# Patient Record
Sex: FEMALE | Race: WHITE | Employment: FULL TIME | URBAN - METROPOLITAN AREA
[De-identification: names, ages, dates, MRNs, and addresses within clinical notes are randomized per-mention and may not be internally consistent; named-entity substitution may affect disease eponyms.]

---

## 2020-03-10 ENCOUNTER — OFFICE VISIT (OUTPATIENT)
Dept: ENDOCRINOLOGY | Age: 45
End: 2020-03-10
Payer: COMMERCIAL

## 2020-03-10 VITALS
DIASTOLIC BLOOD PRESSURE: 77 MMHG | BODY MASS INDEX: 20.88 KG/M2 | OXYGEN SATURATION: 98 % | WEIGHT: 133 LBS | HEIGHT: 67 IN | HEART RATE: 81 BPM | SYSTOLIC BLOOD PRESSURE: 118 MMHG

## 2020-03-10 DIAGNOSIS — E06.3 HYPOTHYROIDISM DUE TO HASHIMOTO'S THYROIDITIS: ICD-10-CM

## 2020-03-10 DIAGNOSIS — E03.8 HYPOTHYROIDISM DUE TO HASHIMOTO'S THYROIDITIS: ICD-10-CM

## 2020-03-10 LAB
ANION GAP SERPL CALCULATED.3IONS-SCNC: 13 MMOL/L (ref 3–16)
BUN BLDV-MCNC: 9 MG/DL (ref 7–20)
CALCIUM SERPL-MCNC: 9.5 MG/DL (ref 8.3–10.6)
CHLORIDE BLD-SCNC: 102 MMOL/L (ref 99–110)
CO2: 25 MMOL/L (ref 21–32)
CREAT SERPL-MCNC: 0.6 MG/DL (ref 0.6–1.1)
GFR AFRICAN AMERICAN: >60
GFR NON-AFRICAN AMERICAN: >60
GLUCOSE BLD-MCNC: 93 MG/DL (ref 70–99)
POTASSIUM SERPL-SCNC: 4.6 MMOL/L (ref 3.5–5.1)
SODIUM BLD-SCNC: 140 MMOL/L (ref 136–145)
T3 FREE: 5.7 PG/ML (ref 2.3–4.2)
T4 FREE: 1 NG/DL (ref 0.9–1.8)
TSH SERPL DL<=0.05 MIU/L-ACNC: 0.01 UIU/ML (ref 0.27–4.2)

## 2020-03-10 PROCEDURE — 99243 OFF/OP CNSLTJ NEW/EST LOW 30: CPT | Performed by: INTERNAL MEDICINE

## 2020-03-10 RX ORDER — LEVOTHYROXINE AND LIOTHYRONINE 57; 13.5 UG/1; UG/1
TABLET ORAL
COMMUNITY
End: 2020-11-12 | Stop reason: SDUPTHER

## 2020-03-10 RX ORDER — LEVOTHYROXINE AND LIOTHYRONINE 76; 18 UG/1; UG/1
TABLET ORAL
COMMUNITY
End: 2020-11-12 | Stop reason: SDUPTHER

## 2020-03-10 RX ORDER — ERGOCALCIFEROL 1.25 MG/1
50000 CAPSULE ORAL WEEKLY
COMMUNITY
End: 2020-06-12 | Stop reason: SDUPTHER

## 2020-03-10 NOTE — PROGRESS NOTES
Endocrinology       New Patient Consultation  Bushra Moser M.D. Phone: [phone]   FAX: [phone]       Adriana Peralta   YOB: 1975    Date of Visit:  3/10/2020    Allergies   Allergen Reactions    Codeine Other (See Comments)     Makes pt mean.  Morphine Other (See Comments)     Makes pt mean      Outpatient Medications Marked as Taking for the 3/10/20 encounter (Office Visit) with May Mckeon MD   Medication Sig Dispense Refill    vitamin D (ERGOCALCIFEROL) 1.25 MG (95136 UT) CAPS capsule Take 50,000 Units by mouth once a week           Vitals:    03/10/20 1035   BP: 118/77   Site: Left Upper Arm   Position: Sitting   Cuff Size: Medium Adult   Pulse: 81   SpO2: 98%   Weight: 133 lb (60.3 kg)   Height: 5' 7\" (1.702 m)     Body mass index is 20.83 kg/m². Wt Readings from Last 3 Encounters:   03/10/20 133 lb (60.3 kg)     BP Readings from Last 3 Encounters:   03/10/20 118/77        Past Medical History:   Diagnosis Date    Hashimoto disease      Past Surgical History:   Procedure Laterality Date    BREAST LUMPECTOMY      HYSTERECTOMY      LEEP      MASTECTOMY      TONSILLECTOMY      UTERINE SUSPENSION       History reviewed. No pertinent family history. Social History     Tobacco Use   Smoking Status Current Every Day Smoker    Packs/day: 1.50    Types: Cigarettes   Smokeless Tobacco Never Used      Social History     Substance and Sexual Activity   Alcohol Use No       HPI      Adriana Peralta is a 53 y.o. female who is here for initial evaluation of thyroid disease        Seen at the request of  Vishnu Lepe DO     Was seeing Dr. Wilder Mera    Patient has a PMH of hypothyroidism, vit D def, breast ca ( s/p surgery, radiation) , Migraine headache. Diagnosed with Hypothyroidism at the age of 30 yrs. Tried levothyroxine /synthroid. Was on synthroid 125 mcg daily.      Had poor response to levothyrxoine    Current thyroid medication: NP

## 2020-03-11 ENCOUNTER — TELEPHONE (OUTPATIENT)
Dept: ENDOCRINOLOGY | Age: 45
End: 2020-03-11

## 2020-03-11 NOTE — TELEPHONE ENCOUNTER
Patient returned call for lab results. Patient was read what christine said. She said she will just flip flop the medications. She wants to know if she needs repeat blood work before her appt .

## 2020-03-12 NOTE — TELEPHONE ENCOUNTER
Called pt and l/m advised per Dr. Hawk Berks: Yes, repeat labs before next visit . Orders in her chart.   3/12/2020 @ 1051am.

## 2020-06-12 ENCOUNTER — VIRTUAL VISIT (OUTPATIENT)
Dept: ENDOCRINOLOGY | Age: 45
End: 2020-06-12
Payer: COMMERCIAL

## 2020-06-12 PROCEDURE — 99214 OFFICE O/P EST MOD 30 MIN: CPT | Performed by: INTERNAL MEDICINE

## 2020-06-12 RX ORDER — ERGOCALCIFEROL 1.25 MG/1
50000 CAPSULE ORAL WEEKLY
Qty: 4 CAPSULE | Refills: 3 | Status: SHIPPED | OUTPATIENT
Start: 2020-06-12 | End: 2020-11-12 | Stop reason: SDUPTHER

## 2020-06-12 ASSESSMENT — ENCOUNTER SYMPTOMS
VOMITING: 0
SHORTNESS OF BREATH: 0
BACK PAIN: 0
NAUSEA: 0
ABDOMINAL PAIN: 0
COUGH: 0
SORE THROAT: 0

## 2020-06-12 NOTE — PROGRESS NOTES
synthroid 125 mcg daily. Had poor response to levothyrxoine    Current thyroid medication:NP Thyroid 90 mg 4 days a week and take 120 mg on other 3 days since 03/20     Takes it first thing in the morning on empty stomach. FH of hypothyroidism: Mother-hypothyroidism/Goiter    FH of thyroid cancer: no      Weight is stable. C/o fatigue    C/o cold intolerance, hot flashes, poor sleep. 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 Review of Systems   HENT: Negative for sore throat. Respiratory: Negative for cough and shortness of breath. Cardiovascular: Negative for chest pain and palpitations. Gastrointestinal: Negative for abdominal pain, nausea and vomiting. Endocrine: Negative for polydipsia. Genitourinary: Negative for frequency and urgency. Musculoskeletal: Negative for back pain and myalgias. Skin: Negative for rash. Neurological: Negative for headaches. Psychiatric/Behavioral: The patient is not nervous/anxious. Brief exam on video visit  Patient alert,  Awake, oriented. Normal speech  No distress noted  Normal eyes   Normal hearing  Normal  hand movements. ,Thyroid US 2014    HISTORY:  follow up.     .Chronic lymphocytic thyroiditis    TECHNIQUE: Static grayscale images and color Doppler of the thyroid were performed. COMPARISON: None. FINDINGS:    Right thyroid lobe: Measures 4.7 x 1.2 x 1.5 cm. Right thyroid lobe is heterogeneous in echogenicity but without discrete mass. Left thyroid lobe: Measures 4.4 x 1.2 x 1.2 cm. Left thyroid lobe is heterogeneous in echogenicity but without discrete mass  Isthmus: Unremarkable. No adjacent masses or enlarged lymph nodes. Result Impression       Heterogeneous thyroid echogenicity consistent with history of thyroiditis         Assessment/Plan      1.  Hypothyroidism     This 50 yrs old female has hypothyroidism    Etiology is hashimoto's thyroiditis. US in 2014 showed :   Heterogeneous thyroid echogenicity consistent with history of thyroiditis. She is currently on NP Thyroid 90 mg 4 days a week and take 120 mg on other 3 days    C/o fatigue, hair loss, dry skin      Will repeat labs      2. Vit D def. On replacement      Will check her levels,       3. Fatigue    Non -specific symptoms    Will check CBC.

## 2020-06-15 ENCOUNTER — TELEPHONE (OUTPATIENT)
Dept: ENDOCRINOLOGY | Age: 45
End: 2020-06-15

## 2020-06-15 ENCOUNTER — HOSPITAL ENCOUNTER (OUTPATIENT)
Age: 45
Discharge: HOME | End: 2020-06-15
Payer: COMMERCIAL

## 2020-06-15 LAB
A/G RATIO: 1.3 (ref 1.1–2.2)
ALBUMIN SERPL-MCNC: 4.3 G/DL (ref 3.4–5)
ALP BLD-CCNC: 158 U/L (ref 40–129)
ALT SERPL-CCNC: 13 U/L (ref 10–40)
ANION GAP SERPL CALCULATED.3IONS-SCNC: 10 MMOL/L (ref 3–16)
AST SERPL-CCNC: 18 U/L (ref 15–37)
BILIRUB SERPL-MCNC: 0.4 MG/DL (ref 0–1)
BUN BLDV-MCNC: 11 MG/DL (ref 7–20)
CALCIUM SERPL-MCNC: 9.9 MG/DL (ref 8.3–10.6)
CHLORIDE BLD-SCNC: 106 MMOL/L (ref 99–110)
CO2: 26 MMOL/L (ref 21–32)
CREAT SERPL-MCNC: 0.7 MG/DL (ref 0.6–1.1)
GFR AFRICAN AMERICAN: >60
GFR NON-AFRICAN AMERICAN: >60
GLOBULIN: 3.2 G/DL
GLUCOSE BLD-MCNC: 106 MG/DL (ref 70–99)
HCT VFR BLD CALC: 42.6 % (ref 36–48)
HEMOGLOBIN: 14.5 G/DL (ref 12–16)
MCH RBC QN AUTO: 31.1 PG (ref 26–34)
MCHC RBC AUTO-ENTMCNC: 34 G/DL (ref 31–36)
MCV RBC AUTO: 91.6 FL (ref 80–100)
PDW BLD-RTO: 12.6 % (ref 12.4–15.4)
PLATELET # BLD: 302 K/UL (ref 135–450)
PMV BLD AUTO: 7.9 FL (ref 5–10.5)
POTASSIUM SERPL-SCNC: 4.3 MMOL/L (ref 3.5–5.1)
RBC # BLD: 4.65 M/UL (ref 4–5.2)
SODIUM BLD-SCNC: 142 MMOL/L (ref 136–145)
TOTAL PROTEIN: 7.5 G/DL (ref 6.4–8.2)
WBC # BLD: 6.2 K/UL (ref 4–11)

## 2020-06-15 PROCEDURE — 84481 FREE ASSAY (FT-3): CPT

## 2020-06-15 PROCEDURE — 82306 VITAMIN D 25 HYDROXY: CPT

## 2020-06-15 PROCEDURE — 84439 ASSAY OF FREE THYROXINE: CPT

## 2020-06-15 PROCEDURE — 36415 COLL VENOUS BLD VENIPUNCTURE: CPT

## 2020-06-15 PROCEDURE — 84443 ASSAY THYROID STIM HORMONE: CPT

## 2020-06-15 PROCEDURE — 85027 COMPLETE CBC AUTOMATED: CPT

## 2020-06-15 PROCEDURE — 80053 COMPREHEN METABOLIC PANEL: CPT

## 2020-06-15 NOTE — TELEPHONE ENCOUNTER
----- Message from Melodie Carrel, MD sent at 6/12/2020 10:30 AM EDT -----  Please schedule a follow-up in 4 month.

## 2020-06-16 ENCOUNTER — TELEPHONE (OUTPATIENT)
Dept: ENDOCRINOLOGY | Age: 45
End: 2020-06-16

## 2020-06-16 LAB
T3 FREE: 8 PG/ML (ref 2.3–4.2)
T4 FREE: 1.4 NG/DL (ref 0.9–1.8)
TSH SERPL DL<=0.05 MIU/L-ACNC: <0.01 UIU/ML (ref 0.27–4.2)
VITAMIN D 25-HYDROXY: 57.9 NG/ML

## 2020-11-12 RX ORDER — ERGOCALCIFEROL 1.25 MG/1
50000 CAPSULE ORAL WEEKLY
Qty: 4 CAPSULE | Refills: 3 | Status: SHIPPED | OUTPATIENT
Start: 2020-11-12

## 2020-11-12 RX ORDER — LEVOTHYROXINE AND LIOTHYRONINE 57; 13.5 UG/1; UG/1
TABLET ORAL
Qty: 30 TABLET | Refills: 3 | Status: SHIPPED | OUTPATIENT
Start: 2020-11-12

## 2020-11-12 RX ORDER — LEVOTHYROXINE AND LIOTHYRONINE 76; 18 UG/1; UG/1
TABLET ORAL
Qty: 10 TABLET | Refills: 3 | Status: SHIPPED | OUTPATIENT
Start: 2020-11-12

## 2020-11-12 NOTE — TELEPHONE ENCOUNTER
Medication:   Requested Prescriptions     Pending Prescriptions Disp Refills    vitamin D (ERGOCALCIFEROL) 1.25 MG (56018 UT) CAPS capsule 4 capsule 3     Sig: Take 1 capsule by mouth once a week    thyroid (NP THYROID) 120 MG tablet 30 tablet      Sig: NP Thyroid 120 mg tablet   Take 1 tablet every day by oral route.  thyroid (NP THYROID) 90 MG tablet 30 tablet      Sig: NP Thyroid 90 mg tablet   Take 1 tablet every day by oral route.          Last appt: 06/12/2020  Next appt: Visit date not found    Last Thyroid:   Lab Results   Component Value Date    TSH <0.01 06/15/2020    FT3 8.0 06/15/2020    T4FREE 1.4 06/15/2020

## 2024-01-03 ENCOUNTER — OFFICE VISIT (OUTPATIENT)
Dept: FAMILY MEDICINE CLINIC | Age: 49
End: 2024-01-03
Payer: COMMERCIAL

## 2024-01-03 VITALS
WEIGHT: 140.6 LBS | SYSTOLIC BLOOD PRESSURE: 102 MMHG | DIASTOLIC BLOOD PRESSURE: 72 MMHG | BODY MASS INDEX: 22.02 KG/M2 | HEART RATE: 77 BPM | OXYGEN SATURATION: 97 %

## 2024-01-03 DIAGNOSIS — E78.2 MIXED HYPERLIPIDEMIA: ICD-10-CM

## 2024-01-03 DIAGNOSIS — I25.2 HISTORY OF NON-ST ELEVATION MYOCARDIAL INFARCTION (NSTEMI): ICD-10-CM

## 2024-01-03 DIAGNOSIS — I10 PRIMARY HYPERTENSION: ICD-10-CM

## 2024-01-03 DIAGNOSIS — C79.51 CARCINOMA OF RIGHT BREAST METASTATIC TO BONE (HCC): ICD-10-CM

## 2024-01-03 DIAGNOSIS — I25.10 CORONARY ARTERY DISEASE INVOLVING NATIVE CORONARY ARTERY OF NATIVE HEART WITHOUT ANGINA PECTORIS: ICD-10-CM

## 2024-01-03 DIAGNOSIS — C50.911 CARCINOMA OF RIGHT BREAST METASTATIC TO BONE (HCC): ICD-10-CM

## 2024-01-03 DIAGNOSIS — Z76.89 ENCOUNTER TO ESTABLISH CARE: Primary | ICD-10-CM

## 2024-01-03 DIAGNOSIS — E06.3 HASHIMOTO'S DISEASE: ICD-10-CM

## 2024-01-03 PROCEDURE — 3078F DIAST BP <80 MM HG: CPT

## 2024-01-03 PROCEDURE — 99204 OFFICE O/P NEW MOD 45 MIN: CPT

## 2024-01-03 PROCEDURE — 3074F SYST BP LT 130 MM HG: CPT

## 2024-01-03 RX ORDER — APIXABAN 2.5 MG/1
2.5 TABLET, FILM COATED ORAL 2 TIMES DAILY
COMMUNITY
Start: 2023-12-01

## 2024-01-03 RX ORDER — LEVOTHYROXINE SODIUM 0.12 MG/1
125 TABLET ORAL DAILY
COMMUNITY
Start: 2023-12-21

## 2024-01-03 RX ORDER — ASPIRIN 81 MG/1
81 TABLET ORAL DAILY
COMMUNITY
Start: 2023-11-14

## 2024-01-03 RX ORDER — PALBOCICLIB 75 MG/1
75 TABLET, FILM COATED ORAL DAILY
COMMUNITY
Start: 2024-01-02

## 2024-01-03 RX ORDER — CARVEDILOL 6.25 MG/1
6.25 TABLET ORAL 2 TIMES DAILY
COMMUNITY
Start: 2023-11-14

## 2024-01-03 RX ORDER — LETROZOLE 2.5 MG/1
2.5 TABLET, FILM COATED ORAL DAILY
COMMUNITY
Start: 2023-12-01

## 2024-01-03 RX ORDER — ROSUVASTATIN CALCIUM 10 MG/1
40 TABLET, COATED ORAL DAILY
COMMUNITY
Start: 2023-11-14

## 2024-01-03 ASSESSMENT — PATIENT HEALTH QUESTIONNAIRE - PHQ9
SUM OF ALL RESPONSES TO PHQ QUESTIONS 1-9: 0
1. LITTLE INTEREST OR PLEASURE IN DOING THINGS: 0
SUM OF ALL RESPONSES TO PHQ9 QUESTIONS 1 & 2: 0
SUM OF ALL RESPONSES TO PHQ QUESTIONS 1-9: 0
2. FEELING DOWN, DEPRESSED OR HOPELESS: 0
SUM OF ALL RESPONSES TO PHQ QUESTIONS 1-9: 0
SUM OF ALL RESPONSES TO PHQ QUESTIONS 1-9: 0

## 2024-01-03 ASSESSMENT — ENCOUNTER SYMPTOMS
GASTROINTESTINAL NEGATIVE: 1
RESPIRATORY NEGATIVE: 1

## 2024-01-03 NOTE — PROGRESS NOTES
Chief Complaint   Patient presents with    New Patient     Establish care       HPI:  Adelaide Kelly is a 48 y.o. (: 1975) here today to establish care.    Hashimoto's: Taking Synthroid 125 mcg daily.  Last TSH 5.87 on 2023.  Free T4 was normal. Is seeing a new endocrinologist in  Dr. Geovani Trinidad through [facility].     Breast CA: Metastasized to bone/right hip.  Following oncology Narcisa Reyes. Taking Ibrance and Letrozole     Hyperlipidemia: Taking Crestor 40 mg daily.    Hypertension/history non-STEMI/CAD, hx dvt: Follows cardiology Dr.Senan Bolden.  Is on aspirin, Eliquis, Coreg, atorvastatin.    Patient's medications, allergies, past medical, surgical, social and family histories were reviewed and updated as appropriate.    Last CMP panel 2023.  Normal kidney function and liver function.    Had colonoscopy through [facility] in . Has breast implants from reconstructive surgery from prior breat CA in .     ROS:  Review of Systems   Constitutional: Negative.    HENT: Negative.     Respiratory: Negative.     Cardiovascular: Negative.    Gastrointestinal: Negative.    Musculoskeletal:  Positive for arthralgias.   Neurological: Negative.    Psychiatric/Behavioral: Negative.             No results found for: \"LABA1C\", \"LDLCALC\", \"LDLDIRECT\"    Past Medical History:   Diagnosis Date    Breast cancer metastasized to bone (HCC)     CAD (coronary artery disease)     Hashimoto disease     History of DVT (deep vein thrombosis)     Mixed hyperlipidemia     NSTEMI (non-ST elevated myocardial infarction) (HCC)     Primary hypertension     Thyroid goiter        History reviewed. No pertinent family history.    Social History     Socioeconomic History    Marital status:      Spouse name: Not on file    Number of children: Not on file    Years of education: Not on file    Highest education level: Not on file   Occupational History    Not on file   Tobacco Use    Smoking status:

## 2024-01-04 DIAGNOSIS — E78.2 MIXED HYPERLIPIDEMIA: Primary | ICD-10-CM

## 2024-01-04 LAB
CHOLEST SERPL-MCNC: 210 MG/DL (ref 0–199)
HDLC SERPL-MCNC: 44 MG/DL (ref 40–60)
LDLC SERPL CALC-MCNC: 117 MG/DL
T3FREE SERPL-MCNC: 2.1 PG/ML (ref 2.3–4.2)
TRIGL SERPL-MCNC: 244 MG/DL (ref 0–150)
VLDLC SERPL CALC-MCNC: 49 MG/DL

## 2024-01-04 RX ORDER — FENOFIBRATE 48 MG/1
48 TABLET, COATED ORAL DAILY
Qty: 30 TABLET | Refills: 3 | Status: SHIPPED | OUTPATIENT
Start: 2024-01-04

## 2024-03-25 ENCOUNTER — OFFICE VISIT (OUTPATIENT)
Dept: FAMILY MEDICINE CLINIC | Age: 49
End: 2024-03-25
Payer: COMMERCIAL

## 2024-03-25 VITALS
WEIGHT: 140 LBS | HEART RATE: 85 BPM | DIASTOLIC BLOOD PRESSURE: 78 MMHG | TEMPERATURE: 99.5 F | BODY MASS INDEX: 21.93 KG/M2 | OXYGEN SATURATION: 98 % | SYSTOLIC BLOOD PRESSURE: 120 MMHG

## 2024-03-25 DIAGNOSIS — R52 BODY ACHES: ICD-10-CM

## 2024-03-25 DIAGNOSIS — J06.9 VIRAL URI: Primary | ICD-10-CM

## 2024-03-25 DIAGNOSIS — R50.9 FEVER, UNSPECIFIED FEVER CAUSE: ICD-10-CM

## 2024-03-25 LAB
INFLUENZA A ANTIGEN, POC: NEGATIVE
INFLUENZA B ANTIGEN, POC: NEGATIVE
LOT EXPIRE DATE: NORMAL
LOT KIT NUMBER: NORMAL
SARS-COV-2, POC: NORMAL
VALID INTERNAL CONTROL: PRESENT
VENDOR AND KIT NAME POC: NORMAL

## 2024-03-25 PROCEDURE — 87428 SARSCOV & INF VIR A&B AG IA: CPT

## 2024-03-25 PROCEDURE — 99214 OFFICE O/P EST MOD 30 MIN: CPT

## 2024-03-25 RX ORDER — AZITHROMYCIN 250 MG/1
TABLET, FILM COATED ORAL
Qty: 6 TABLET | Refills: 0 | Status: SHIPPED | OUTPATIENT
Start: 2024-03-25 | End: 2024-04-04

## 2024-03-25 SDOH — ECONOMIC STABILITY: FOOD INSECURITY: WITHIN THE PAST 12 MONTHS, THE FOOD YOU BOUGHT JUST DIDN'T LAST AND YOU DIDN'T HAVE MONEY TO GET MORE.: NEVER TRUE

## 2024-03-25 SDOH — ECONOMIC STABILITY: HOUSING INSECURITY
IN THE LAST 12 MONTHS, WAS THERE A TIME WHEN YOU DID NOT HAVE A STEADY PLACE TO SLEEP OR SLEPT IN A SHELTER (INCLUDING NOW)?: NO

## 2024-03-25 SDOH — ECONOMIC STABILITY: FOOD INSECURITY: WITHIN THE PAST 12 MONTHS, YOU WORRIED THAT YOUR FOOD WOULD RUN OUT BEFORE YOU GOT MONEY TO BUY MORE.: NEVER TRUE

## 2024-03-25 SDOH — ECONOMIC STABILITY: INCOME INSECURITY: HOW HARD IS IT FOR YOU TO PAY FOR THE VERY BASICS LIKE FOOD, HOUSING, MEDICAL CARE, AND HEATING?: NOT HARD AT ALL

## 2024-03-25 ASSESSMENT — ENCOUNTER SYMPTOMS
RHINORRHEA: 1
GASTROINTESTINAL NEGATIVE: 1
SORE THROAT: 1
SINUS PAIN: 1
TROUBLE SWALLOWING: 0
COUGH: 1

## 2024-03-25 NOTE — PROGRESS NOTES
Chief Complaint   Patient presents with    Fever     Body aches, chills, cough, x 2 days        HPI:  Adelaide Kelly is a 48 y.o. (: 1975) here today   for evaluation of x20 hours fever, bodies, chills, cough, fatigue. Slight sore throat but does not have tonsils and adenoids. Temp 99.5 Fahrenheit today in office. High of 102F lastnight. Took marichuy seltzer yesterday but did not help. Cough is dry.     Is on Ibrance which can lower the patient's immune system.      Patient's medications, allergies, past medical, surgical, social and family histories were reviewed and updated asappropriate.    ROS:  Review of Systems   Constitutional:  Positive for chills, fatigue and fever.   HENT:  Positive for rhinorrhea, sinus pain and sore throat. Negative for ear pain and trouble swallowing.    Respiratory:  Positive for cough.    Cardiovascular: Negative.    Gastrointestinal: Negative.    Musculoskeletal:  Positive for myalgias.   Neurological: Negative.    Psychiatric/Behavioral: Negative.             Prior to Visit Medications    Medication Sig Taking? Authorizing Provider   azithromycin (ZITHROMAX) 250 MG tablet 500mg on day 1 followed by 250mg on days 2 - 5 Yes Josiah Gould, APRN - CNP   fenofibrate (TRICOR) 48 MG tablet Take 1 tablet by mouth daily  Josiah Gould APRN - CNP   letrozole (FEMARA) 2.5 MG tablet Take 1 tablet by mouth daily  Shelly Davila MD   IBRANCE 75 MG tablet Take 75 mg by mouth daily  Shelly Davila MD   carvedilol (COREG) 6.25 MG tablet Take 1 tablet by mouth 2 times daily  Shelly Davila MD   vitamin D (CHOLECALCIFEROL) 25 MCG (1000 UT) TABS tablet Take 1 tablet by mouth daily  Shelly Davlia MD   ASPIRIN ADULT LOW STRENGTH 81 MG EC tablet Take 1 tablet by mouth daily  Shelly Davila MD   rosuvastatin (CRESTOR) 10 MG tablet Take 4 tablets by mouth daily  Shelly Davila MD   levothyroxine (SYNTHROID) 125 MCG tablet Take 1 tablet by mouth

## 2024-04-01 ENCOUNTER — OFFICE VISIT (OUTPATIENT)
Dept: FAMILY MEDICINE CLINIC | Age: 49
End: 2024-04-01
Payer: COMMERCIAL

## 2024-04-01 VITALS
TEMPERATURE: 97.6 F | BODY MASS INDEX: 21.3 KG/M2 | HEART RATE: 74 BPM | DIASTOLIC BLOOD PRESSURE: 78 MMHG | OXYGEN SATURATION: 98 % | WEIGHT: 136 LBS | SYSTOLIC BLOOD PRESSURE: 124 MMHG

## 2024-04-01 DIAGNOSIS — B37.31 VAGINAL YEAST INFECTION: ICD-10-CM

## 2024-04-01 DIAGNOSIS — J22 ACUTE RESPIRATORY INFECTION: Primary | ICD-10-CM

## 2024-04-01 PROCEDURE — 99214 OFFICE O/P EST MOD 30 MIN: CPT

## 2024-04-01 RX ORDER — CEFDINIR 300 MG/1
300 CAPSULE ORAL 2 TIMES DAILY
Qty: 20 CAPSULE | Refills: 0 | Status: SHIPPED | OUTPATIENT
Start: 2024-04-01 | End: 2024-04-11

## 2024-04-01 RX ORDER — DEXTROMETHORPHAN HYDROBROMIDE AND PROMETHAZINE HYDROCHLORIDE 15; 6.25 MG/5ML; MG/5ML
5 SYRUP ORAL 4 TIMES DAILY PRN
Qty: 120 ML | Refills: 0 | Status: SHIPPED | OUTPATIENT
Start: 2024-04-01 | End: 2024-04-08

## 2024-04-01 RX ORDER — FLUCONAZOLE 150 MG/1
150 TABLET ORAL
Qty: 2 TABLET | Refills: 0 | Status: SHIPPED | OUTPATIENT
Start: 2024-04-01 | End: 2024-04-07

## 2024-04-01 ASSESSMENT — ENCOUNTER SYMPTOMS
WHEEZING: 1
COUGH: 1
SINUS PRESSURE: 1
RHINORRHEA: 1
GASTROINTESTINAL NEGATIVE: 1

## 2024-04-01 NOTE — PROGRESS NOTES
UT) TABS tablet Take 1 tablet by mouth daily Yes Provider, Historical, MD   ASPIRIN ADULT LOW STRENGTH 81 MG EC tablet Take 1 tablet by mouth daily Yes Provider, Historical, MD   rosuvastatin (CRESTOR) 10 MG tablet Take 4 tablets by mouth daily Yes Provider, Historical, MD   levothyroxine (SYNTHROID) 125 MCG tablet Take 1 tablet by mouth Daily Yes Provider, Historical, MD   ELIQUIS 2.5 MG TABS tablet Take 1 tablet by mouth 2 times daily Yes Provider, Historical, MD   ALLERGY RELIEF, LORATADINE, 10 MG tablet Take 1 tablet by mouth daily Yes Provider, Historical, MD   fluconazole (DIFLUCAN) 150 MG tablet Take 1 tablet by mouth every 72 hours for 6 days Yes Josiah Gould, APRN - CNP       Allergies   Allergen Reactions    Azithromycin Diarrhea    Codeine Other (See Comments)     Makes pt mean.     Morphine Other (See Comments)     Makes pt mean        OBJECTIVE:    /78   Pulse 74   Temp 97.6 °F (36.4 °C)   Wt 61.7 kg (136 lb)   SpO2 98%   BMI 21.30 kg/m²     BP Readings from Last 2 Encounters:   04/01/24 124/78   03/25/24 120/78       Wt Readings from Last 3 Encounters:   04/01/24 61.7 kg (136 lb)   03/25/24 63.5 kg (140 lb)   01/03/24 63.8 kg (140 lb 9.6 oz)       Physical Exam  Constitutional:       Appearance: Normal appearance.   HENT:      Right Ear: Tympanic membrane, ear canal and external ear normal. There is no impacted cerumen.      Left Ear: Tympanic membrane, ear canal and external ear normal. There is no impacted cerumen.      Nose: Congestion and rhinorrhea present.   Cardiovascular:      Rate and Rhythm: Normal rate and regular rhythm.      Pulses: Normal pulses.      Heart sounds: Normal heart sounds.   Pulmonary:      Effort: Pulmonary effort is normal.      Breath sounds: Normal breath sounds. No wheezing.   Abdominal:      General: Bowel sounds are normal.   Musculoskeletal:         General: Normal range of motion.   Skin:     General: Skin is warm.      Capillary Refill: Capillary

## 2024-04-24 ENCOUNTER — COMMUNITY OUTREACH (OUTPATIENT)
Dept: FAMILY MEDICINE CLINIC | Age: 49
End: 2024-04-24

## 2024-07-19 ENCOUNTER — OFFICE VISIT (OUTPATIENT)
Dept: FAMILY MEDICINE CLINIC | Age: 49
End: 2024-07-19
Payer: COMMERCIAL

## 2024-07-19 VITALS
DIASTOLIC BLOOD PRESSURE: 76 MMHG | WEIGHT: 142 LBS | HEIGHT: 67 IN | HEART RATE: 68 BPM | BODY MASS INDEX: 22.29 KG/M2 | OXYGEN SATURATION: 98 % | SYSTOLIC BLOOD PRESSURE: 108 MMHG

## 2024-07-19 DIAGNOSIS — E78.2 MIXED HYPERLIPIDEMIA: ICD-10-CM

## 2024-07-19 DIAGNOSIS — E55.9 VITAMIN D DEFICIENCY: ICD-10-CM

## 2024-07-19 DIAGNOSIS — I25.10 CORONARY ARTERY DISEASE INVOLVING NATIVE CORONARY ARTERY OF NATIVE HEART WITHOUT ANGINA PECTORIS: ICD-10-CM

## 2024-07-19 DIAGNOSIS — E06.3 HASHIMOTO'S DISEASE: ICD-10-CM

## 2024-07-19 DIAGNOSIS — I10 PRIMARY HYPERTENSION: Primary | ICD-10-CM

## 2024-07-19 LAB
25(OH)D3 SERPL-MCNC: 44.7 NG/ML
ALBUMIN SERPL-MCNC: 4.3 G/DL (ref 3.4–5)
ALBUMIN/GLOB SERPL: 1.7 {RATIO} (ref 1.1–2.2)
ALP SERPL-CCNC: 100 U/L (ref 40–129)
ALT SERPL-CCNC: 6 U/L (ref 10–40)
ANION GAP SERPL CALCULATED.3IONS-SCNC: 9 MMOL/L (ref 3–16)
AST SERPL-CCNC: 14 U/L (ref 15–37)
BASOPHILS # BLD: 0.1 K/UL (ref 0–0.2)
BASOPHILS NFR BLD: 2.5 %
BILIRUB SERPL-MCNC: 0.4 MG/DL (ref 0–1)
BUN SERPL-MCNC: 14 MG/DL (ref 7–20)
CALCIUM SERPL-MCNC: 9.3 MG/DL (ref 8.3–10.6)
CHLORIDE SERPL-SCNC: 107 MMOL/L (ref 99–110)
CHOLEST SERPL-MCNC: 249 MG/DL (ref 0–199)
CO2 SERPL-SCNC: 27 MMOL/L (ref 21–32)
CREAT SERPL-MCNC: 1 MG/DL (ref 0.6–1.1)
DEPRECATED RDW RBC AUTO: 14.4 % (ref 12.4–15.4)
EOSINOPHIL # BLD: 0 K/UL (ref 0–0.6)
EOSINOPHIL NFR BLD: 1.2 %
GFR SERPLBLD CREATININE-BSD FMLA CKD-EPI: 68 ML/MIN/{1.73_M2}
GLUCOSE SERPL-MCNC: 92 MG/DL (ref 70–99)
HCT VFR BLD AUTO: 37.4 % (ref 36–48)
HDLC SERPL-MCNC: 45 MG/DL (ref 40–60)
HGB BLD-MCNC: 12.9 G/DL (ref 12–16)
LDLC SERPL CALC-MCNC: 174 MG/DL
LYMPHOCYTES # BLD: 1.3 K/UL (ref 1–5.1)
LYMPHOCYTES NFR BLD: 33.7 %
MCH RBC QN AUTO: 38.2 PG (ref 26–34)
MCHC RBC AUTO-ENTMCNC: 34.5 G/DL (ref 31–36)
MCV RBC AUTO: 110.9 FL (ref 80–100)
MONOCYTES # BLD: 0.4 K/UL (ref 0–1.3)
MONOCYTES NFR BLD: 9.7 %
NEUTROPHILS # BLD: 2.1 K/UL (ref 1.7–7.7)
NEUTROPHILS NFR BLD: 52.9 %
PATH INTERP BLD-IMP: YES
PLATELET # BLD AUTO: 229 K/UL (ref 135–450)
PMV BLD AUTO: 8.1 FL (ref 5–10.5)
POTASSIUM SERPL-SCNC: 4.1 MMOL/L (ref 3.5–5.1)
PROT SERPL-MCNC: 6.9 G/DL (ref 6.4–8.2)
RBC # BLD AUTO: 3.37 M/UL (ref 4–5.2)
SODIUM SERPL-SCNC: 143 MMOL/L (ref 136–145)
T4 FREE SERPL-MCNC: 0.6 NG/DL (ref 0.9–1.8)
TRIGL SERPL-MCNC: 148 MG/DL (ref 0–150)
TSH SERPL DL<=0.005 MIU/L-ACNC: 104.6 UIU/ML (ref 0.27–4.2)
VLDLC SERPL CALC-MCNC: 30 MG/DL
WBC # BLD AUTO: 3.9 K/UL (ref 4–11)

## 2024-07-19 PROCEDURE — 3074F SYST BP LT 130 MM HG: CPT

## 2024-07-19 PROCEDURE — 3078F DIAST BP <80 MM HG: CPT

## 2024-07-19 PROCEDURE — 99214 OFFICE O/P EST MOD 30 MIN: CPT

## 2024-07-19 PROCEDURE — G2211 COMPLEX E/M VISIT ADD ON: HCPCS

## 2024-07-19 ASSESSMENT — ENCOUNTER SYMPTOMS
RESPIRATORY NEGATIVE: 1
GASTROINTESTINAL NEGATIVE: 1

## 2024-07-19 NOTE — PROGRESS NOTES
Chief Complaint   Patient presents with    Check-Up     6 month check up       HPI:  Adelaide Kelly is a 49 y.o. (: 1975) here today   for 6 month office visit.     Thyroid disorder: has not been taking her meds. Is prescribed synthroid 125mcg in epic. Follows endocrine through [facility].     HTN, CAD: follows cardio. Taking Coreg, eliquis, ASA.        Patient's medications, allergies, past medical, surgical, social and family histories were reviewed and updated as appropriate.    ROS:  Review of Systems   Constitutional: Negative.    HENT: Negative.     Respiratory: Negative.     Cardiovascular: Negative.    Gastrointestinal: Negative.    Neurological: Negative.    Psychiatric/Behavioral: Negative.             No results found for: \"LABA1C\", \"LDLDIRECT\"    Past Medical History:   Diagnosis Date    Breast cancer metastasized to bone (HCC)     CAD (coronary artery disease)     Hashimoto disease     History of DVT (deep vein thrombosis)     Mixed hyperlipidemia     NSTEMI (non-ST elevated myocardial infarction) (HCC)     Primary hypertension     Thyroid goiter        No family history on file.    Social History     Socioeconomic History    Marital status:      Spouse name: Not on file    Number of children: Not on file    Years of education: Not on file    Highest education level: Not on file   Occupational History    Not on file   Tobacco Use    Smoking status: Every Day     Current packs/day: 1.50     Types: Cigarettes    Smokeless tobacco: Never   Vaping Use    Vaping Use: Not on file   Substance and Sexual Activity    Alcohol use: No    Drug use: No    Sexual activity: Not on file   Other Topics Concern    Not on file   Social History Narrative    Not on file     Social Determinants of Health     Financial Resource Strain: Low Risk  (3/25/2024)    Overall Financial Resource Strain (CARDIA)     Difficulty of Paying Living Expenses: Not hard at all   Food Insecurity: No Food Insecurity (3/25/2024)

## 2024-07-22 DIAGNOSIS — R79.89 TSH ELEVATION: Primary | ICD-10-CM

## 2024-07-22 DIAGNOSIS — R79.89 LOW T4: ICD-10-CM

## 2024-07-22 DIAGNOSIS — E06.3 HASHIMOTO'S DISEASE: Primary | ICD-10-CM

## 2024-07-22 LAB — PATH INTERP BLD-IMP: NORMAL

## 2024-07-22 RX ORDER — LEVOTHYROXINE SODIUM 0.05 MG/1
50 TABLET ORAL DAILY
Qty: 30 TABLET | Refills: 0 | Status: SHIPPED | OUTPATIENT
Start: 2024-07-22

## 2024-07-25 ENCOUNTER — TELEPHONE (OUTPATIENT)
Dept: FAMILY MEDICINE CLINIC | Age: 49
End: 2024-07-25

## 2024-07-25 DIAGNOSIS — E06.3 HASHIMOTO'S DISEASE: Primary | ICD-10-CM

## 2024-08-23 ENCOUNTER — LAB (OUTPATIENT)
Dept: FAMILY MEDICINE CLINIC | Age: 49
End: 2024-08-23
Payer: COMMERCIAL

## 2024-08-23 ENCOUNTER — TELEPHONE (OUTPATIENT)
Dept: FAMILY MEDICINE CLINIC | Age: 49
End: 2024-08-23

## 2024-08-23 DIAGNOSIS — R79.89 TSH ELEVATION: ICD-10-CM

## 2024-08-23 DIAGNOSIS — R79.89 LOW T4: ICD-10-CM

## 2024-08-23 DIAGNOSIS — E06.3 HASHIMOTO'S DISEASE: Primary | ICD-10-CM

## 2024-08-23 LAB
T4 FREE SERPL-MCNC: 0.9 NG/DL (ref 0.9–1.8)
TSH SERPL DL<=0.005 MIU/L-ACNC: 23.1 UIU/ML (ref 0.27–4.2)

## 2024-08-23 PROCEDURE — 36415 COLL VENOUS BLD VENIPUNCTURE: CPT

## 2024-08-23 NOTE — PROGRESS NOTES
Blood drawn per order.  Needle size: 23 g  Site: L Antecubital.  First attempt successful Yes    Second attempt no    Pressure applied until bleeding stopped.  Pressure applied.    Patient informed to call office or return if bleeding reoccurs and unable to stop.    Tubes drawn: 0 purple     1 red  \

## 2024-08-23 NOTE — TELEPHONE ENCOUNTER
PT requested a referral to a rheumatologist. Pt has Hashimoto Disease and wants to see about being tested for other autoimmune diseases  Her name is Adelaide Edgar fax [phone]  Ph. [phone]

## 2024-08-26 DIAGNOSIS — E06.3 HASHIMOTO'S DISEASE: ICD-10-CM

## 2024-08-26 DIAGNOSIS — R79.89 ELEVATED TSH: Primary | ICD-10-CM

## 2024-08-26 DIAGNOSIS — E06.3 HASHIMOTO'S DISEASE: Primary | ICD-10-CM

## 2024-08-26 DIAGNOSIS — R79.89 ELEVATED TSH: ICD-10-CM

## 2024-08-26 LAB — T3FREE SERPL-MCNC: 1.5 PG/ML (ref 2.3–4.2)

## 2024-08-26 RX ORDER — LEVOTHYROXINE SODIUM 75 UG/1
75 TABLET ORAL DAILY
Qty: 30 TABLET | Refills: 0 | Status: SHIPPED | OUTPATIENT
Start: 2024-08-26

## 2024-08-26 RX ORDER — LIOTHYRONINE SODIUM 5 UG/1
5 TABLET ORAL DAILY
Qty: 30 TABLET | Refills: 3 | Status: SHIPPED | OUTPATIENT
Start: 2024-08-26

## 2024-10-03 DIAGNOSIS — E06.3 HASHIMOTO'S DISEASE: ICD-10-CM

## 2024-10-03 RX ORDER — LEVOTHYROXINE SODIUM 75 UG/1
75 TABLET ORAL DAILY
Qty: 30 TABLET | Refills: 0 | Status: SHIPPED | OUTPATIENT
Start: 2024-10-03

## 2024-10-03 NOTE — TELEPHONE ENCOUNTER
Last office visit 08/23/2024, advised to follow-up 0, next appointment 0.  Will be seeing Dr. Mclain in future

## 2024-10-28 DIAGNOSIS — E06.3 HASHIMOTO'S DISEASE: ICD-10-CM

## 2024-10-28 RX ORDER — LEVOTHYROXINE SODIUM 75 UG/1
75 TABLET ORAL DAILY
Qty: 30 TABLET | Refills: 1 | Status: SHIPPED | OUTPATIENT
Start: 2024-10-28

## 2025-01-02 ENCOUNTER — OFFICE VISIT (OUTPATIENT)
Dept: FAMILY MEDICINE CLINIC | Age: 50
End: 2025-01-02
Payer: MEDICARE

## 2025-01-02 VITALS
SYSTOLIC BLOOD PRESSURE: 128 MMHG | WEIGHT: 141.4 LBS | HEIGHT: 67 IN | DIASTOLIC BLOOD PRESSURE: 70 MMHG | HEART RATE: 76 BPM | OXYGEN SATURATION: 98 % | BODY MASS INDEX: 22.19 KG/M2

## 2025-01-02 DIAGNOSIS — E55.9 VITAMIN D DEFICIENCY: ICD-10-CM

## 2025-01-02 DIAGNOSIS — R35.0 URINARY FREQUENCY: ICD-10-CM

## 2025-01-02 DIAGNOSIS — I25.10 CORONARY ARTERY DISEASE INVOLVING NATIVE CORONARY ARTERY OF NATIVE HEART WITHOUT ANGINA PECTORIS: Chronic | ICD-10-CM

## 2025-01-02 DIAGNOSIS — I25.2 HISTORY OF NON-ST ELEVATION MYOCARDIAL INFARCTION (NSTEMI): Chronic | ICD-10-CM

## 2025-01-02 DIAGNOSIS — C50.911 CARCINOMA OF RIGHT BREAST METASTATIC TO BONE (HCC): Chronic | ICD-10-CM

## 2025-01-02 DIAGNOSIS — L82.1 SK (SEBORRHEIC KERATOSIS): ICD-10-CM

## 2025-01-02 DIAGNOSIS — E78.2 MIXED HYPERLIPIDEMIA: Chronic | ICD-10-CM

## 2025-01-02 DIAGNOSIS — I10 PRIMARY HYPERTENSION: Primary | Chronic | ICD-10-CM

## 2025-01-02 DIAGNOSIS — E06.3 HASHIMOTO'S DISEASE: ICD-10-CM

## 2025-01-02 DIAGNOSIS — Z86.2 HISTORY OF HYPERCOAGULABLE STATE: ICD-10-CM

## 2025-01-02 DIAGNOSIS — F17.210 TOBACCO DEPENDENCE DUE TO CIGARETTES: ICD-10-CM

## 2025-01-02 DIAGNOSIS — H65.06 RECURRENT ACUTE SEROUS OTITIS MEDIA OF BOTH EARS: ICD-10-CM

## 2025-01-02 DIAGNOSIS — R73.9 HYPERGLYCEMIA: ICD-10-CM

## 2025-01-02 DIAGNOSIS — C79.51 CARCINOMA OF RIGHT BREAST METASTATIC TO BONE (HCC): Chronic | ICD-10-CM

## 2025-01-02 LAB
ALBUMIN SERPL-MCNC: 4.6 G/DL (ref 3.4–5)
ALBUMIN/GLOB SERPL: 1.6 {RATIO} (ref 1.1–2.2)
ALP SERPL-CCNC: 139 U/L (ref 40–129)
ALT SERPL-CCNC: 25 U/L (ref 10–40)
ANION GAP SERPL CALCULATED.3IONS-SCNC: 10 MMOL/L (ref 3–16)
AST SERPL-CCNC: 24 U/L (ref 15–37)
BILIRUB SERPL-MCNC: 0.3 MG/DL (ref 0–1)
BILIRUBIN, POC: NEGATIVE
BLOOD URINE, POC: NEGATIVE
BUN SERPL-MCNC: 13 MG/DL (ref 7–20)
CALCIUM SERPL-MCNC: 9.5 MG/DL (ref 8.3–10.6)
CHLORIDE SERPL-SCNC: 105 MMOL/L (ref 99–110)
CHOLEST SERPL-MCNC: 296 MG/DL (ref 0–199)
CLARITY, POC: CLEAR
CO2 SERPL-SCNC: 26 MMOL/L (ref 21–32)
COLOR, POC: YELLOW
CREAT SERPL-MCNC: 0.9 MG/DL (ref 0.6–1.1)
ERYTHROCYTE [SEDIMENTATION RATE] IN BLOOD BY WESTERGREN METHOD: 40 MM/HR (ref 0–30)
EST. AVERAGE GLUCOSE BLD GHB EST-MCNC: 99.7 MG/DL
GFR SERPLBLD CREATININE-BSD FMLA CKD-EPI: 77 ML/MIN/{1.73_M2}
GLUCOSE SERPL-MCNC: 94 MG/DL (ref 70–99)
GLUCOSE URINE, POC: NEGATIVE MG/DL
HBA1C MFR BLD: 5.1 %
HDLC SERPL-MCNC: 60 MG/DL (ref 40–60)
KETONES, POC: NEGATIVE MG/DL
LDLC SERPL CALC-MCNC: 205 MG/DL
LEUKOCYTE EST, POC: NEGATIVE
NITRITE, POC: NEGATIVE
PH, POC: 6.5
POTASSIUM SERPL-SCNC: 4.4 MMOL/L (ref 3.5–5.1)
PROT SERPL-MCNC: 7.4 G/DL (ref 6.4–8.2)
PROTEIN, POC: NEGATIVE MG/DL
SODIUM SERPL-SCNC: 141 MMOL/L (ref 136–145)
SPECIFIC GRAVITY, POC: 1.01
TRIGL SERPL-MCNC: 157 MG/DL (ref 0–150)
UROBILINOGEN, POC: 0.2 MG/DL
VLDLC SERPL CALC-MCNC: 31 MG/DL

## 2025-01-02 PROCEDURE — 81002 URINALYSIS NONAUTO W/O SCOPE: CPT | Performed by: FAMILY MEDICINE

## 2025-01-02 PROCEDURE — 3078F DIAST BP <80 MM HG: CPT | Performed by: FAMILY MEDICINE

## 2025-01-02 PROCEDURE — 3074F SYST BP LT 130 MM HG: CPT | Performed by: FAMILY MEDICINE

## 2025-01-02 PROCEDURE — 99215 OFFICE O/P EST HI 40 MIN: CPT | Performed by: FAMILY MEDICINE

## 2025-01-02 RX ORDER — TRIAMCINOLONE ACETONIDE 55 UG/1
2 SPRAY, METERED NASAL DAILY
Qty: 1 EACH | Refills: 0 | Status: SHIPPED | OUTPATIENT
Start: 2025-01-02

## 2025-01-02 SDOH — ECONOMIC STABILITY: FOOD INSECURITY: WITHIN THE PAST 12 MONTHS, YOU WORRIED THAT YOUR FOOD WOULD RUN OUT BEFORE YOU GOT MONEY TO BUY MORE.: NEVER TRUE

## 2025-01-02 SDOH — ECONOMIC STABILITY: FOOD INSECURITY: WITHIN THE PAST 12 MONTHS, THE FOOD YOU BOUGHT JUST DIDN'T LAST AND YOU DIDN'T HAVE MONEY TO GET MORE.: NEVER TRUE

## 2025-01-02 SDOH — ECONOMIC STABILITY: INCOME INSECURITY: HOW HARD IS IT FOR YOU TO PAY FOR THE VERY BASICS LIKE FOOD, HOUSING, MEDICAL CARE, AND HEATING?: NOT HARD AT ALL

## 2025-01-02 ASSESSMENT — PATIENT HEALTH QUESTIONNAIRE - PHQ9
SUM OF ALL RESPONSES TO PHQ9 QUESTIONS 1 & 2: 0
2. FEELING DOWN, DEPRESSED OR HOPELESS: NOT AT ALL
SUM OF ALL RESPONSES TO PHQ QUESTIONS 1-9: 0
1. LITTLE INTEREST OR PLEASURE IN DOING THINGS: NOT AT ALL

## 2025-01-02 NOTE — PROGRESS NOTES
Skin:     Comments: She has a raised pigmented lesion on the posterior of the right arm.  Approximately 0.5 cm.   Neurological:      Mental Status: She is alert and oriented to person, place, and time.            On this date 1/2/2025 I have spent 55 minutes reviewing previous notes, test results and face to face with the patient discussing the diagnosis and importance of compliance with the treatment plan as well as documenting on the day of the visit.      An electronic signature was used to authenticate this note.    --Albin Mclain MD

## 2025-01-02 NOTE — ASSESSMENT & PLAN NOTE
Lab work ordered today.  Discussed patient would like to see rheumatology and ENT.  Voices displeasure with endocrinology as current management of the thyroid disorder.    Orders:    Veda Dunn MD, Rheumatology, [facility]    THYROID PEROXIDASE ANTIBODY; Future    NO    Sedimentation Rate    Ana Morris DO, Otolaryngology, [facility]

## 2025-01-02 NOTE — ASSESSMENT & PLAN NOTE
Stable follow-up with cardiology.  Encouraged smoking cessation declines at this time.    Orders:    Comprehensive Metabolic Panel

## 2025-01-02 NOTE — ASSESSMENT & PLAN NOTE
Stable continue carvedilol follow-up with cardiology.    Orders:    Comprehensive Metabolic Panel

## 2025-01-02 NOTE — ASSESSMENT & PLAN NOTE
Did not tolerate statins.  History of CAD.  Indicates cardiology has requested prior authorization on another product awaiting insurance.    Orders:    Comprehensive Metabolic Panel    Lipid Panel

## 2025-01-03 LAB — ANA SER QL IA: NEGATIVE

## 2025-01-07 DIAGNOSIS — E06.3 HASHIMOTO'S DISEASE: ICD-10-CM

## 2025-01-07 RX ORDER — LEVOTHYROXINE SODIUM 75 UG/1
75 TABLET ORAL DAILY
Qty: 30 TABLET | Refills: 5 | Status: SHIPPED | OUTPATIENT
Start: 2025-01-07

## 2025-01-09 RX ORDER — LIOTHYRONINE SODIUM 5 UG/1
5 TABLET ORAL DAILY
Qty: 90 TABLET | Refills: 3 | Status: SHIPPED | OUTPATIENT
Start: 2025-01-09

## 2025-01-22 ENCOUNTER — OFFICE VISIT (OUTPATIENT)
Dept: ENT CLINIC | Age: 50
End: 2025-01-22
Payer: MEDICARE

## 2025-01-22 VITALS
HEART RATE: 59 BPM | BODY MASS INDEX: 22.1 KG/M2 | DIASTOLIC BLOOD PRESSURE: 80 MMHG | SYSTOLIC BLOOD PRESSURE: 119 MMHG | WEIGHT: 140.8 LBS | HEIGHT: 67 IN

## 2025-01-22 DIAGNOSIS — H91.93 BILATERAL HEARING LOSS, UNSPECIFIED HEARING LOSS TYPE: ICD-10-CM

## 2025-01-22 DIAGNOSIS — J34.89 NASAL DRYNESS: Primary | ICD-10-CM

## 2025-01-22 PROCEDURE — 3079F DIAST BP 80-89 MM HG: CPT | Performed by: OTOLARYNGOLOGY

## 2025-01-22 PROCEDURE — 3074F SYST BP LT 130 MM HG: CPT | Performed by: OTOLARYNGOLOGY

## 2025-01-22 PROCEDURE — 99203 OFFICE O/P NEW LOW 30 MIN: CPT | Performed by: OTOLARYNGOLOGY

## 2025-01-22 ASSESSMENT — ENCOUNTER SYMPTOMS
SORE THROAT: 0
COUGH: 0
SINUS PRESSURE: 0
FACIAL SWELLING: 0
APNEA: 0
EYE ITCHING: 0
SHORTNESS OF BREATH: 0
TROUBLE SWALLOWING: 0
VOICE CHANGE: 0

## 2025-01-22 NOTE — PROGRESS NOTES
[facility]  [address], [address]  [place] [address]  P: [phone]       Patient     Adelaide Kelly  1975    ChiefComplaint     Chief Complaint   Patient presents with    New Patient    Ear Problem     Recurrent acute serous otitis media of both ears Hashimoto's disease         History of Present Illness     Adelaide is a 49-year-old female here today for evaluation of bilateral hearing loss.  States she noticed hearing decline in 2021 when she started immunotherapy for metastatic breast cancer.  Had chemotherapy in 2013.  Notes that she does not have any wax and she is wondering if that is what is causing hearing loss.  Also notes significant nasal dryness occurred after she had bilateral apical tubes placed.  Finds that when she uses medicated sprays it we will call sinus infection with green drainage within 2 days.    Past Medical History     Past Medical History:   Diagnosis Date    ADHD (attention deficit hyperactivity disorder) 2000    Allergic rhinitis     Anticoagulant long-term use 11/2021    History  of blood clots    Breast cancer metastasized to bone (HCC)     CAD (coronary artery disease)     Hashimoto disease     Headache 2020    History of DVT (deep vein thrombosis)     Hypothyroidism 1999/2000    Mixed hyperlipidemia     NSTEMI (non-ST elevated myocardial infarction) (HCC)     Primary hypertension     Thyroid goiter        Past Surgical History     Past Surgical History:   Procedure Laterality Date    BREAST ENHANCEMENT SURGERY      with mastectomy    BREAST LUMPECTOMY      CORONARY ANGIOPLASTY WITH STENT PLACEMENT      EYE SURGERY  2024    Eye stents for blocked tear ducts    HIP SURGERY      HIP SURGERY      HIP SURGERY      HYSTERECTOMY (CERVIX STATUS UNKNOWN)      HYSTERECTOMY, VAGINAL  2005    JOINT REPLACEMENT  2021    LEEP      MASTECTOMY Bilateral     PTCA  11/2021    TEAR DUCT SURGERY      TONSILLECTOMY      TOTAL HIP ARTHROPLASTY      UTERINE

## 2025-02-05 ENCOUNTER — OFFICE VISIT (OUTPATIENT)
Dept: FAMILY MEDICINE CLINIC | Age: 50
End: 2025-02-05
Payer: MEDICARE

## 2025-02-05 VITALS
SYSTOLIC BLOOD PRESSURE: 102 MMHG | BODY MASS INDEX: 21.63 KG/M2 | HEART RATE: 68 BPM | DIASTOLIC BLOOD PRESSURE: 64 MMHG | HEIGHT: 67 IN | OXYGEN SATURATION: 99 % | WEIGHT: 137.8 LBS

## 2025-02-05 DIAGNOSIS — B37.31 VAGINAL YEAST INFECTION: ICD-10-CM

## 2025-02-05 DIAGNOSIS — J20.9 ACUTE BRONCHITIS, UNSPECIFIED ORGANISM: Primary | ICD-10-CM

## 2025-02-05 PROCEDURE — 3074F SYST BP LT 130 MM HG: CPT | Performed by: FAMILY MEDICINE

## 2025-02-05 PROCEDURE — 99213 OFFICE O/P EST LOW 20 MIN: CPT | Performed by: FAMILY MEDICINE

## 2025-02-05 PROCEDURE — 3078F DIAST BP <80 MM HG: CPT | Performed by: FAMILY MEDICINE

## 2025-02-05 RX ORDER — PREDNISONE 20 MG/1
40 TABLET ORAL DAILY
Qty: 10 TABLET | Refills: 0 | Status: SHIPPED | OUTPATIENT
Start: 2025-02-05 | End: 2025-02-10

## 2025-02-05 RX ORDER — BENZONATATE 200 MG/1
200 CAPSULE ORAL 3 TIMES DAILY PRN
Qty: 30 CAPSULE | Refills: 0 | Status: SHIPPED | OUTPATIENT
Start: 2025-02-05 | End: 2025-02-15

## 2025-02-05 RX ORDER — DOXYCYCLINE HYCLATE 100 MG
100 TABLET ORAL 2 TIMES DAILY
Qty: 14 TABLET | Refills: 0 | Status: SHIPPED | OUTPATIENT
Start: 2025-02-05 | End: 2025-02-12

## 2025-02-05 RX ORDER — FLUCONAZOLE 150 MG/1
150 TABLET ORAL ONCE
Qty: 1 TABLET | Refills: 0 | Status: SHIPPED | OUTPATIENT
Start: 2025-02-05 | End: 2025-02-05

## 2025-02-05 RX ORDER — ALBUTEROL SULFATE 90 UG/1
2 INHALANT RESPIRATORY (INHALATION) EVERY 6 HOURS PRN
Qty: 18 G | Refills: 3 | Status: SHIPPED | OUTPATIENT
Start: 2025-02-05

## 2025-02-05 SDOH — ECONOMIC STABILITY: FOOD INSECURITY: WITHIN THE PAST 12 MONTHS, THE FOOD YOU BOUGHT JUST DIDN'T LAST AND YOU DIDN'T HAVE MONEY TO GET MORE.: NEVER TRUE

## 2025-02-05 SDOH — ECONOMIC STABILITY: FOOD INSECURITY: WITHIN THE PAST 12 MONTHS, YOU WORRIED THAT YOUR FOOD WOULD RUN OUT BEFORE YOU GOT MONEY TO BUY MORE.: NEVER TRUE

## 2025-02-05 NOTE — PROGRESS NOTES
Adelaide Kelly (:  1975) is a 49 y.o. female,Established patient, here for evaluation of the following chief complaint(s):  No chief complaint on file.         Assessment & Plan  Acute bronchitis, unspecified organism    Prednisone and doxycycline.  Tessalon as needed for cough.  Albuterol as needed for wheeze.  Advised to take the doxycycline with food to avoid upset stomach.  Follow-up as needed    Orders:    predniSONE (DELTASONE) 20 MG tablet; Take 2 tablets by mouth daily for 5 days    benzonatate (TESSALON) 200 MG capsule; Take 1 capsule by mouth 3 times daily as needed for Cough    doxycycline hyclate (VIBRA-TABS) 100 MG tablet; Take 1 tablet by mouth 2 times daily for 7 days    albuterol sulfate HFA (PROVENTIL;VENTOLIN;PROAIR) 108 (90 Base) MCG/ACT inhaler; Inhale 2 puffs into the lungs every 6 hours as needed for Wheezing    Vaginal yeast infection  Fluconazole for use after antibiotic therapy.    Orders:    fluconazole (DIFLUCAN) 150 MG tablet; Take 1 tablet by mouth once for 1 dose      No follow-ups on file.       Subjective   HPI  49-year-old female here for acute office visit 7+ day history of cough congestion wheeze.  Cough is productive.  She has had chills.  Unknown fever.  Review of Systems   No vomiting diarrhea constipation    Objective   Physical Exam  HENT:      Head: Normocephalic and atraumatic.   Eyes:      Extraocular Movements: Extraocular movements intact.   Cardiovascular:      Rate and Rhythm: Normal rate and regular rhythm.   Pulmonary:      Effort: Pulmonary effort is normal.      Breath sounds: Wheezing and rhonchi present. No rales.   Musculoskeletal:      Cervical back: Neck supple.   Neurological:      Mental Status: She is alert and oriented to person, place, and time.                  An electronic signature was used to authenticate this note.    --Albin Mclain MD

## 2025-02-13 ENCOUNTER — PROCEDURE VISIT (OUTPATIENT)
Dept: AUDIOLOGY | Age: 50
End: 2025-02-13
Payer: MEDICARE

## 2025-02-13 ENCOUNTER — TELEPHONE (OUTPATIENT)
Dept: PULMONOLOGY | Age: 50
End: 2025-02-13

## 2025-02-13 DIAGNOSIS — R91.1 LUNG NODULE: Primary | ICD-10-CM

## 2025-02-13 DIAGNOSIS — H93.8X3 EAR PRESSURE, BILATERAL: Primary | ICD-10-CM

## 2025-02-13 DIAGNOSIS — Z01.10 ENCOUNTER FOR EXAM OF EARS AND HEARING W/O ABNORMAL FINDINGS: ICD-10-CM

## 2025-02-13 PROCEDURE — 92567 TYMPANOMETRY: CPT | Performed by: AUDIOLOGIST

## 2025-02-13 PROCEDURE — 92557 COMPREHENSIVE HEARING TEST: CPT | Performed by: AUDIOLOGIST

## 2025-02-13 NOTE — PROGRESS NOTES
Adelaide Kelly   1975, 49 y.o. female   [id number]       Referring Provider: Ana Ribeiro DO  Referral Type: In an order in Epic    Reason for Visit: Evaluation of the cause of disorders of hearing, tinnitus, or balance.    ADULT AUDIOLOGIC EVALUATION      Adelaide Kelly is a 49 y.o. female seen today, 2/13/2025 , for an initial audiologic evaluation.  Patient was alone.    AUDIOLOGIC AND OTHER PERTINENT MEDICAL HISTORY:      Adelaide Kelly noted aural fullness, history of occupational noise exposure, and family history of hearing loss.  Patient reports intermittent bilateral aural fullness with sinus pressure.  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  Patient has a family history of hearing loss, grandmother, occurring later in life.  Medical history is reportedly significant for eye stents.     Adelaide Kelly denied otalgia, tinnitus, dizziness, history of head trauma, and history of ear surgery.    Date: 2/13/2025     IMPRESSIONS:      Abnormal middle ear pressure with normal compliance, bilaterally.  Normal hearing sensitivity with excellent word understanding at normal conversation level, bilaterally.  Follow medical recommendations of Ana Ribeiro DO.     ASSESSMENT AND FINDINGS:     Otoscopy revealed: Clear ear canals bilaterally    RIGHT EAR:  Hearing Sensitivity:Hearing sensitivity within normal limits from 250-8000 Hz  Speech Recognition Threshold: 15 dB HL  Word Recognition:Excellent (100%), based on NU-6 Ordered-by-Difficulty 10-word list at 50 dBHL using recorded speech stimuli.    Tympanometry: Negative peak pressure with normal compliance, Type C tympanogram, consistent with ETD/history of otitis media.      LEFT EAR:  Hearing Sensitivity:Hearing sensitivity within normal limits from 250-8000 Hz.  Conductive component present at 4000 Hz  Speech Recognition Threshold: 15 dB HL  Word Recognition:Excellent

## 2025-02-13 NOTE — TELEPHONE ENCOUNTER
Received referral today for patient from [facility] for this patient.    Referral is not really urgent according to what they sent it for.            They wanted it done ASAP because patient has Stage 4 Metastatic breast cancer.  She has not had a CT since August 2024 but needs one done here.    I spoke with nurse and explained it was not an urgent matter but nurse and Dr. Rachna Carranza (oncologist) would like it an urgent matter and for patient to have CT and appointment right away. She also suggested for you to call to  @ [phone].    I explained that I would send a note and ask but the embolism was from 8-2024 and nothing after that.    Please advise

## 2025-02-17 ENCOUNTER — HOSPITAL ENCOUNTER (OUTPATIENT)
Dept: CT IMAGING | Age: 50
Discharge: HOME | End: 2025-02-17
Payer: MEDICARE

## 2025-02-17 DIAGNOSIS — R91.1 LUNG NODULE: ICD-10-CM

## 2025-02-17 PROCEDURE — 71250 CT THORAX DX C-: CPT

## 2025-02-18 ENCOUNTER — TELEPHONE (OUTPATIENT)
Dept: PULMONOLOGY | Age: 50
End: 2025-02-18

## 2025-02-18 ENCOUNTER — OFFICE VISIT (OUTPATIENT)
Dept: PULMONOLOGY | Age: 50
End: 2025-02-18
Payer: MEDICARE

## 2025-02-18 VITALS
SYSTOLIC BLOOD PRESSURE: 134 MMHG | DIASTOLIC BLOOD PRESSURE: 86 MMHG | WEIGHT: 142.2 LBS | BODY MASS INDEX: 22.32 KG/M2 | OXYGEN SATURATION: 98 % | RESPIRATION RATE: 18 BRPM | HEIGHT: 67 IN | HEART RATE: 74 BPM

## 2025-02-18 DIAGNOSIS — Z72.0 TOBACCO ABUSE: ICD-10-CM

## 2025-02-18 DIAGNOSIS — R06.2 WHEEZES: ICD-10-CM

## 2025-02-18 DIAGNOSIS — R91.8 PULMONARY NODULES: ICD-10-CM

## 2025-02-18 DIAGNOSIS — R06.02 SHORTNESS OF BREATH: Primary | ICD-10-CM

## 2025-02-18 DIAGNOSIS — R05.9 COUGH, UNSPECIFIED TYPE: ICD-10-CM

## 2025-02-18 PROCEDURE — 3075F SYST BP GE 130 - 139MM HG: CPT | Performed by: INTERNAL MEDICINE

## 2025-02-18 PROCEDURE — 99204 OFFICE O/P NEW MOD 45 MIN: CPT | Performed by: INTERNAL MEDICINE

## 2025-02-18 PROCEDURE — 3079F DIAST BP 80-89 MM HG: CPT | Performed by: INTERNAL MEDICINE

## 2025-02-18 RX ORDER — FLUTICASONE FUROATE, UMECLIDINIUM BROMIDE AND VILANTEROL TRIFENATATE 200; 62.5; 25 UG/1; UG/1; UG/1
1 POWDER RESPIRATORY (INHALATION) DAILY
Qty: 1 EACH | Refills: 3 | Status: SHIPPED | OUTPATIENT
Start: 2025-02-18

## 2025-02-18 RX ORDER — PREDNISONE 10 MG/1
TABLET ORAL
Qty: 30 TABLET | Refills: 0 | Status: SHIPPED | OUTPATIENT
Start: 2025-02-18

## 2025-02-18 RX ORDER — ALBUTEROL SULFATE 90 UG/1
2 INHALANT RESPIRATORY (INHALATION) EVERY 4 HOURS PRN
Qty: 36 G | Refills: 5 | Status: SHIPPED | OUTPATIENT
Start: 2025-02-18 | End: 2026-02-18

## 2025-02-18 NOTE — PROGRESS NOTES
[facility]                                                                    CHIEF COMPLAINT: Cough     Consulting provider: Dr. Carranza     HPI:   49 y.o. with breast cancer on Fulvestrant Nov 2024. 2 shots a month, does not feel better between the shots. Since then cough SOB and wheezes. Cough with clear to green sputum. No hemoptysis. Smoker 1 ppd since age 13. Uses Albuterol, uses it 2 ondina es/day with no much help. Tried steroids and Abx, completed with no much benefit. Prior records reviewed by me and summarized.    Past Medical History:   Diagnosis Date    ADHD (attention deficit hyperactivity disorder) 2000    Allergic rhinitis     Anticoagulant long-term use 11/2021    History  of blood clots    Breast cancer metastasized to bone (HCC)     CAD (coronary artery disease)     Hashimoto disease     Headache 2020    History of DVT (deep vein thrombosis)     Hypothyroidism 1999/2000    Mixed hyperlipidemia     NSTEMI (non-ST elevated myocardial infarction) (HCC)     Primary hypertension     Thyroid goiter        Past Surgical History:        Procedure Laterality Date    BREAST ENHANCEMENT SURGERY      with mastectomy    BREAST LUMPECTOMY      CORONARY ANGIOPLASTY WITH STENT PLACEMENT      EYE SURGERY  2024    Eye stents for blocked tear ducts    HIP SURGERY      HIP SURGERY      HIP SURGERY      HYSTERECTOMY (CERVIX STATUS UNKNOWN)      HYSTERECTOMY, VAGINAL  2005    JOINT REPLACEMENT  2021    LEEP      MASTECTOMY Bilateral     PTCA  11/2021    TEAR DUCT SURGERY      TONSILLECTOMY      TOTAL HIP ARTHROPLASTY      UTERINE SUSPENSION         Allergies:  is allergic to statins, azithromycin, codeine, and morphine.  Social History:    TOBACCO:   reports that she has been smoking cigarettes. She started smoking about 35 years ago. She has a 53.6 pack-year smoking history. She has been exposed to tobacco smoke. She has never used smokeless

## 2025-04-02 ENCOUNTER — OFFICE VISIT (OUTPATIENT)
Dept: FAMILY MEDICINE CLINIC | Age: 50
End: 2025-04-02
Payer: MEDICARE

## 2025-04-02 ENCOUNTER — OFFICE VISIT (OUTPATIENT)
Dept: PULMONOLOGY | Age: 50
End: 2025-04-02
Payer: MEDICARE

## 2025-04-02 ENCOUNTER — HOSPITAL ENCOUNTER (OUTPATIENT)
Dept: PULMONOLOGY | Age: 50
Discharge: HOME | End: 2025-04-02
Payer: MEDICARE

## 2025-04-02 VITALS
HEART RATE: 76 BPM | DIASTOLIC BLOOD PRESSURE: 82 MMHG | OXYGEN SATURATION: 98 % | BODY MASS INDEX: 21.82 KG/M2 | WEIGHT: 139 LBS | HEIGHT: 67 IN | SYSTOLIC BLOOD PRESSURE: 132 MMHG

## 2025-04-02 VITALS
WEIGHT: 140.2 LBS | DIASTOLIC BLOOD PRESSURE: 84 MMHG | BODY MASS INDEX: 22 KG/M2 | OXYGEN SATURATION: 98 % | SYSTOLIC BLOOD PRESSURE: 130 MMHG | RESPIRATION RATE: 16 BRPM | HEIGHT: 67 IN | HEART RATE: 67 BPM

## 2025-04-02 DIAGNOSIS — Z00.01 ENCOUNTER FOR GENERAL ADULT MEDICAL EXAMINATION WITH ABNORMAL FINDINGS: ICD-10-CM

## 2025-04-02 DIAGNOSIS — J43.9 PULMONARY EMPHYSEMA, UNSPECIFIED EMPHYSEMA TYPE (HCC): Primary | ICD-10-CM

## 2025-04-02 DIAGNOSIS — R91.8 PULMONARY NODULES: ICD-10-CM

## 2025-04-02 DIAGNOSIS — G89.29 OTHER CHRONIC PAIN: ICD-10-CM

## 2025-04-02 DIAGNOSIS — Z00.00 INITIAL MEDICARE ANNUAL WELLNESS VISIT: Primary | ICD-10-CM

## 2025-04-02 DIAGNOSIS — R94.2 DIFFUSION CAPACITY OF LUNG (DL), DECREASED: ICD-10-CM

## 2025-04-02 DIAGNOSIS — R06.02 SHORTNESS OF BREATH: ICD-10-CM

## 2025-04-02 LAB
DLCO %PRED: 50 %
DLCO PRED: NORMAL
DLCO/VA %PRED: NORMAL
DLCO/VA PRED: NORMAL
DLCO/VA: NORMAL
DLCO: NORMAL
EXPIRATORY TIME-POST: NORMAL
EXPIRATORY TIME: NORMAL
FEF 25-75 %CHNG: NORMAL
FEF 25-75 POST %PRED: NORMAL
FEF 25-75% %PRED-PRE: NORMAL
FEF 25-75% PRED: NORMAL
FEF 25-75-POST: NORMAL
FEF 25-75-PRE: NORMAL
FEV1 %PRED-POST: NORMAL
FEV1 %PRED-PRE: 85 %
FEV1 PRED: NORMAL
FEV1-POST: NORMAL
FEV1-PRE: NORMAL
FEV1/FVC %PRED-POST: NORMAL
FEV1/FVC %PRED-PRE: NORMAL
FEV1/FVC PRED: NORMAL
FEV1/FVC-POST: NORMAL
FEV1/FVC-PRE: 78 %
FVC %PRED-POST: NORMAL
FVC %PRED-PRE: NORMAL
FVC PRED: NORMAL
FVC-POST: NORMAL
FVC-PRE: NORMAL
GAW %PRED: NORMAL
GAW PRED: NORMAL
GAW: NORMAL
IC PRE %PRED: NORMAL
IC PRED: NORMAL
IC: NORMAL
MEP: NORMAL
MIP: NORMAL
MVV %PRED-PRE: NORMAL
MVV PRED: NORMAL
MVV-PRE: NORMAL
PEF %PRED-POST: NORMAL
PEF %PRED-PRE: NORMAL
PEF PRED: NORMAL
PEF%CHNG: NORMAL
PEF-POST: NORMAL
PEF-PRE: NORMAL
RAW %PRED: NORMAL
RAW PRED: NORMAL
RAW: NORMAL
RV PRE %PRED: NORMAL
RV PRED: NORMAL
RV: NORMAL
SVC %PRED: NORMAL
SVC PRED: NORMAL
SVC: NORMAL
TLC PRE %PRED: 90 %
TLC PRED: NORMAL
TLC: NORMAL
VA %PRED: NORMAL
VA PRED: NORMAL
VA: NORMAL
VTG %PRED: NORMAL
VTG PRED: NORMAL
VTG: NORMAL

## 2025-04-02 PROCEDURE — G0438 PPPS, INITIAL VISIT: HCPCS | Performed by: FAMILY MEDICINE

## 2025-04-02 PROCEDURE — 3075F SYST BP GE 130 - 139MM HG: CPT | Performed by: FAMILY MEDICINE

## 2025-04-02 PROCEDURE — 3079F DIAST BP 80-89 MM HG: CPT | Performed by: FAMILY MEDICINE

## 2025-04-02 PROCEDURE — 3079F DIAST BP 80-89 MM HG: CPT | Performed by: INTERNAL MEDICINE

## 2025-04-02 PROCEDURE — 94618 PULMONARY STRESS TESTING: CPT

## 2025-04-02 PROCEDURE — 99214 OFFICE O/P EST MOD 30 MIN: CPT | Performed by: INTERNAL MEDICINE

## 2025-04-02 PROCEDURE — 3075F SYST BP GE 130 - 139MM HG: CPT | Performed by: INTERNAL MEDICINE

## 2025-04-02 PROCEDURE — 93000 ELECTROCARDIOGRAM COMPLETE: CPT | Performed by: FAMILY MEDICINE

## 2025-04-02 PROCEDURE — 94010 BREATHING CAPACITY TEST: CPT

## 2025-04-02 PROCEDURE — 94729 DIFFUSING CAPACITY: CPT

## 2025-04-02 PROCEDURE — 94726 PLETHYSMOGRAPHY LUNG VOLUMES: CPT

## 2025-04-02 RX ORDER — CAPECITABINE 500 MG/1
1500 TABLET, FILM COATED ORAL
COMMUNITY
Start: 2025-03-19

## 2025-04-02 RX ORDER — CLOBETASOL PROPIONATE 0.5 MG/G
CREAM TOPICAL
COMMUNITY
Start: 2025-01-20

## 2025-04-02 RX ORDER — ACYCLOVIR 400 MG/1
1 TABLET ORAL
COMMUNITY
Start: 2024-12-16

## 2025-04-02 RX ORDER — ALBUTEROL SULFATE 90 UG/1
4 INHALANT RESPIRATORY (INHALATION) ONCE
Status: CANCELLED | OUTPATIENT
Start: 2025-04-02

## 2025-04-02 RX ORDER — ACETAMINOPHEN 500 MG
1000 TABLET ORAL EVERY 6 HOURS PRN
COMMUNITY

## 2025-04-02 RX ORDER — NITROGLYCERIN 0.4 MG/1
TABLET SUBLINGUAL
COMMUNITY

## 2025-04-02 SDOH — HEALTH STABILITY: PHYSICAL HEALTH: ON AVERAGE, HOW MANY DAYS PER WEEK DO YOU ENGAGE IN MODERATE TO STRENUOUS EXERCISE (LIKE A BRISK WALK)?: 4 DAYS

## 2025-04-02 ASSESSMENT — PULMONARY FUNCTION TESTS
FEV1/FVC_PRE: 78
FEV1_PERCENT_PREDICTED_PRE: 85

## 2025-04-02 ASSESSMENT — LIFESTYLE VARIABLES
HOW MANY STANDARD DRINKS CONTAINING ALCOHOL DO YOU HAVE ON A TYPICAL DAY: 0
HOW MANY STANDARD DRINKS CONTAINING ALCOHOL DO YOU HAVE ON A TYPICAL DAY: PATIENT DOES NOT DRINK
HOW OFTEN DO YOU HAVE A DRINK CONTAINING ALCOHOL: NEVER
HOW OFTEN DO YOU HAVE A DRINK CONTAINING ALCOHOL: 1
HOW OFTEN DO YOU HAVE SIX OR MORE DRINKS ON ONE OCCASION: 1
HOW OFTEN DO YOU HAVE A DRINK CONTAINING ALCOHOL: NEVER
HOW MANY STANDARD DRINKS CONTAINING ALCOHOL DO YOU HAVE ON A TYPICAL DAY: PATIENT DOES NOT DRINK

## 2025-04-02 ASSESSMENT — PATIENT HEALTH QUESTIONNAIRE - PHQ9
1. LITTLE INTEREST OR PLEASURE IN DOING THINGS: NOT AT ALL
1. LITTLE INTEREST OR PLEASURE IN DOING THINGS: NOT AT ALL
SUM OF ALL RESPONSES TO PHQ QUESTIONS 1-9: 0
2. FEELING DOWN, DEPRESSED OR HOPELESS: NOT AT ALL
SUM OF ALL RESPONSES TO PHQ QUESTIONS 1-9: 0
2. FEELING DOWN, DEPRESSED OR HOPELESS: NOT AT ALL
SUM OF ALL RESPONSES TO PHQ QUESTIONS 1-9: 0

## 2025-04-02 NOTE — PROGRESS NOTES
tablet by mouth daily, Disp: , Rfl:     ELIQUIS 2.5 MG TABS tablet, Take 1 tablet by mouth 2 times daily, Disp: , Rfl:     ALLERGY RELIEF, LORATADINE, 10 MG tablet, Take 1 tablet by mouth daily, Disp: , Rfl:       Objective:   PHYSICAL EXAM:    Blood pressure 130/84, pulse 67, resp. rate 16, height 1.702 m (5' 7\"), weight 63.6 kg (140 lb 3.2 oz), SpO2 98%.' on RA  Gen: No distress.   Eyes: No sclera icterus. No conjunctival injection.   ENT: No discharge. Pharynx clear.   Neck: Trachea midline.   Resp: No accessory muscle use. No crackles.  Scattered wheezes. No rhonchi. No dullness on percussion. Good air entry.   CV: Regular rate. Regular rhythm. No edema.   Skin: Warm and dry.   M/S: No cyanosis. No clubbing.   Neuro: Awake. Alert. Moves all four extremities.   Psych: Oriented x 3. No anxiety.           DATA reviewed by me:   PFTs 04/02/2025 FVC 3.36(86%) FEV1 2.62(85%) FEV1/FVC 78% TLC 4.97(90%) DLCO 14.07(50%) 6MW 1260 F LO2 96%     CT chest 2/17/25 images independently reviewed by me and showed:  Multiple bilateral nonspecific pulmonary nodules measuring up to 4 mm.      Assessment:       Pulmonary emphysema   Moderately decreased diffusion capacity.  Probably emphysema related.  Echo late last year does not suggest pulmonary hypertension.  Fulvestrant could cause eosinophilic pneumonia-no evidence of that radiographically.  Will continue to follow-up. Off medication now   Pulmonary nodules on CT chest 2/17/25, largest 4 mm RUL. DDx: Granuloma, Inflammatory Conditions, Scarring, Metastatic Disease, and Malignancy.  Stage IV breast cancer with bone mets on Fulvestrant  Nov 2024-Feb 2025. Now on Xeloda   CAD post PCI  History of PE November 2021 on Eliquis  1 ppd since age of 13       Plan:       Continue Trelegy daily   Continue Albuterol 2 puffs Q4-6 hrs PRN  CT chest as planned May 2025  Smoking cessation counseling  Follow-up after CT chest

## 2025-04-02 NOTE — PROGRESS NOTES
Medicare Annual Wellness Visit    Adelaide Kelly is here for Medicare AWV    Assessment & Plan   Initial Medicare annual wellness visit  -     EKG 12 Lead  Encounter for general adult medical examination with abnormal findings  Other chronic pain     Return in 1 year (on 4/2/2026) for Medicare AWV.     Subjective   The following acute and/or chronic problems were also addressed today:  49-year-old female here for annual wellness visit today.  Reports she was seen in urgent care recently with right-sided chest pain and with cough.  This is near the area of her breast cancer also near the area where the muscle flap was taken for her reconstructive surgery.  Indicates urgent care did an x-ray did not find any infiltrate or signs of damage to the ribs.  She is following up with pulmonology today and oncology next month.  She has started a new cancer medication Xeloda and indicates she has had headaches with this but will discuss it with hematology oncology.    Patient's complete Health Risk Assessment and screening values have been reviewed and are found in Flowsheets. The following problems were reviewed today and where indicated follow up appointments were made and/or referrals ordered.    Positive Risk Factor Screenings with Interventions:             General HRA Questions:  Select all that apply: (!) New or Increased Pain  Interventions - Pain:  Following up with H/O.  Will obtain baseline EKG.  Chest x-ray performed in urgent care or noted to be negative.  Will defer to heme-onc regarding additional imaging consider CT PET if symptoms worsen however patient reports they are improving at this time       Poor Eating Habits/Diet:  Do you eat balanced/healthy meals regularly?: (!) No  Interventions:  Patient declines any further evaluation or treatment       Vision Screen:  Do you have difficulty driving, watching TV, or doing any of your daily activities because of your eyesight?: No  Have you had an eye exam

## 2025-04-03 NOTE — PROCEDURES
[address] [address]                           PULMONARY FUNCTION      PATIENT NAME: NATHAN DURAN        : 1975  MED REC NO: [id number]                      ROOM:   ACCOUNT NO: [id number]                       ADMIT DATE: 2025  PROVIDER: Francisco Mccloud MD      DATE OF PROCEDURE: 2025    SURGEON:  Francisco Mccloud MD    REFERRING PHYSICIAN:  JULITO GONZALEZ    INDICATION:  Dyspnea.    INTERPRETATION:    1. Spirometry:  The FVC is 86% predicted.  The FEV1 is 85% predicted.  The FEV1/FVC ratio is 0.78.    2. Plethysmography:  The total lung capacity is 90% of predicted.    3. The diffusion capacity for carbon monoxide is 50% predicted.    4. Flow volume loop is normal.    IMPRESSION:  The patient has normal spirometry with no obstructive defect.  The lung volumes are normal.  The DLCO does have a moderate decrease which is nonspecific in isolation but can indicate pulmonary emphysema given the patient's history of smoking.      A 6-minute walk test was conducted per [facility] Protocols.  The patient ambulated 1260 feet with no oxyhemoglobin desaturation from baseline.  The heart rate at rest is 91 and heart rate maximum was 102.          FRANCISCO MCCLOUD MD      D:  2025 18:46:45     T:  2025 23:07:52     BHAKTI/DONNA  Job #:  [id number]     Doc#:  [id number]

## 2025-04-24 ENCOUNTER — OFFICE VISIT (OUTPATIENT)
Dept: FAMILY MEDICINE CLINIC | Age: 50
End: 2025-04-24
Payer: MEDICARE

## 2025-04-24 VITALS
HEIGHT: 67 IN | WEIGHT: 139.8 LBS | OXYGEN SATURATION: 99 % | DIASTOLIC BLOOD PRESSURE: 80 MMHG | SYSTOLIC BLOOD PRESSURE: 128 MMHG | HEART RATE: 87 BPM | BODY MASS INDEX: 21.94 KG/M2

## 2025-04-24 DIAGNOSIS — C79.51 CARCINOMA OF RIGHT BREAST METASTATIC TO BONE (HCC): Primary | ICD-10-CM

## 2025-04-24 DIAGNOSIS — R07.81 RIB PAIN: ICD-10-CM

## 2025-04-24 DIAGNOSIS — C50.911 CARCINOMA OF RIGHT BREAST METASTATIC TO BONE (HCC): Primary | ICD-10-CM

## 2025-04-24 PROCEDURE — 3079F DIAST BP 80-89 MM HG: CPT | Performed by: FAMILY MEDICINE

## 2025-04-24 PROCEDURE — 3074F SYST BP LT 130 MM HG: CPT | Performed by: FAMILY MEDICINE

## 2025-04-24 PROCEDURE — 99214 OFFICE O/P EST MOD 30 MIN: CPT | Performed by: FAMILY MEDICINE

## 2025-04-24 RX ORDER — TRAMADOL HYDROCHLORIDE 50 MG/1
50 TABLET ORAL EVERY 6 HOURS PRN
Qty: 28 TABLET | Refills: 0 | Status: SHIPPED | OUTPATIENT
Start: 2025-04-24 | End: 2025-05-01

## 2025-04-24 RX ORDER — AMLODIPINE BESYLATE 5 MG/1
5 TABLET ORAL DAILY
COMMUNITY
Start: 2025-04-15

## 2025-04-24 NOTE — PROGRESS NOTES
Adelaide Kelly (:  1975) is a 49 y.o. female,Established patient, here for evaluation of the following chief complaint(s):  Flank Pain and Lower Back Pain (X2-3 days)           Assessment & Plan  Carcinoma of right breast metastatic to bone (HCC)       Orders:    traMADol (ULTRAM) 50 MG tablet; Take 1 tablet by mouth every 6 hours as needed for Pain for up to 7 days. Intended supply: 3 days. Take lowest dose possible to manage pain Max Daily Amount: 200 mg    XR RIBS RIGHT INCLUDE CHEST (MIN 3 VIEWS); Future    Rib pain       Orders:    traMADol (ULTRAM) 50 MG tablet; Take 1 tablet by mouth every 6 hours as needed for Pain for up to 7 days. Intended supply: 3 days. Take lowest dose possible to manage pain Max Daily Amount: 200 mg    XR RIBS RIGHT INCLUDE CHEST (MIN 3 VIEWS); Future      No follow-ups on file.       Subjective   HPI  49-year-old female here for acute office visit.  Complains of pain in the right low back.  History of breast cancer with bony metastasis.  Indicates she sneezed 2 days ago felt a pop in the right back.  Has extreme tenderness in this area.    Review of Systems       Objective   Vitals:    25 1800   BP: 128/80   Pulse: 87   SpO2: 99%     Physical Exam  Constitutional:       Appearance: Normal appearance.   HENT:      Head: Normocephalic and atraumatic.   Eyes:      Extraocular Movements: Extraocular movements intact.   Musculoskeletal:         General: Swelling, tenderness and deformity present.        Arms:       Comments: Tender raised area appears to be originating from below the skin.   Neurological:      General: No focal deficit present.      Mental Status: She is alert.                This dictation was generated by a voice recognition computer software.  Although all attempts are made to edit the dictation for accuracy, there may be errors in the transcription that are not intended.    An electronic signature was used to authenticate this note.    --Albin

## 2025-04-24 NOTE — ASSESSMENT & PLAN NOTE
Orders:    traMADol (ULTRAM) 50 MG tablet; Take 1 tablet by mouth every 6 hours as needed for Pain for up to 7 days. Intended supply: 3 days. Take lowest dose possible to manage pain Max Daily Amount: 200 mg    XR RIBS RIGHT INCLUDE CHEST (MIN 3 VIEWS); Future

## 2025-05-17 ENCOUNTER — CLINICAL DOCUMENTATION (OUTPATIENT)
Dept: OTHER | Facility: CLINIC | Age: 50
End: 2025-05-17

## 2025-05-29 ENCOUNTER — HOSPITAL ENCOUNTER (OUTPATIENT)
Dept: CT IMAGING | Age: 50
Discharge: HOME | End: 2025-05-29
Attending: INTERNAL MEDICINE
Payer: MEDICARE

## 2025-05-29 DIAGNOSIS — R91.8 PULMONARY NODULES: ICD-10-CM

## 2025-05-29 PROCEDURE — 71250 CT THORAX DX C-: CPT

## 2025-05-30 ENCOUNTER — RESULTS FOLLOW-UP (OUTPATIENT)
Dept: PULMONOLOGY | Age: 50
End: 2025-05-30

## 2025-08-20 ENCOUNTER — OFFICE VISIT (OUTPATIENT)
Dept: FAMILY MEDICINE CLINIC | Age: 50
End: 2025-08-20
Payer: MEDICARE

## 2025-08-20 VITALS
RESPIRATION RATE: 20 BRPM | HEART RATE: 76 BPM | DIASTOLIC BLOOD PRESSURE: 76 MMHG | OXYGEN SATURATION: 98 % | BODY MASS INDEX: 20.36 KG/M2 | SYSTOLIC BLOOD PRESSURE: 124 MMHG | WEIGHT: 130 LBS

## 2025-08-20 DIAGNOSIS — E06.3 HASHIMOTO'S DISEASE: ICD-10-CM

## 2025-08-20 DIAGNOSIS — C79.51 CARCINOMA OF RIGHT BREAST METASTATIC TO BONE (HCC): ICD-10-CM

## 2025-08-20 DIAGNOSIS — J01.00 ACUTE NON-RECURRENT MAXILLARY SINUSITIS: Primary | ICD-10-CM

## 2025-08-20 DIAGNOSIS — C50.911 CARCINOMA OF RIGHT BREAST METASTATIC TO BONE (HCC): ICD-10-CM

## 2025-08-20 PROCEDURE — 3074F SYST BP LT 130 MM HG: CPT | Performed by: FAMILY MEDICINE

## 2025-08-20 PROCEDURE — 99214 OFFICE O/P EST MOD 30 MIN: CPT | Performed by: FAMILY MEDICINE

## 2025-08-20 PROCEDURE — 3078F DIAST BP <80 MM HG: CPT | Performed by: FAMILY MEDICINE

## 2025-08-20 RX ORDER — FLUCONAZOLE 150 MG/1
150 TABLET ORAL ONCE
Qty: 1 TABLET | Refills: 0 | Status: SHIPPED | OUTPATIENT
Start: 2025-08-20 | End: 2025-08-20

## 2025-08-20 RX ORDER — CEPHALEXIN 500 MG/1
500 CAPSULE ORAL 3 TIMES DAILY
Qty: 21 CAPSULE | Refills: 0 | Status: SHIPPED | OUTPATIENT
Start: 2025-08-20 | End: 2025-08-27

## 2025-08-20 ASSESSMENT — ENCOUNTER SYMPTOMS
DIARRHEA: 0
CONSTIPATION: 0
VOMITING: 0
SHORTNESS OF BREATH: 0
NAUSEA: 0

## 2025-08-21 ENCOUNTER — CARE COORDINATION (OUTPATIENT)
Dept: CARE COORDINATION | Age: 50
End: 2025-08-21

## 2025-08-28 ENCOUNTER — CARE COORDINATION (OUTPATIENT)
Dept: CARE COORDINATION | Age: 50
End: 2025-08-28